# Patient Record
Sex: FEMALE | Race: WHITE | NOT HISPANIC OR LATINO | URBAN - METROPOLITAN AREA
[De-identification: names, ages, dates, MRNs, and addresses within clinical notes are randomized per-mention and may not be internally consistent; named-entity substitution may affect disease eponyms.]

---

## 2018-01-08 ENCOUNTER — NEW PATIENT (OUTPATIENT)
Dept: URBAN - METROPOLITAN AREA CLINIC 51 | Facility: CLINIC | Age: 68
End: 2018-01-08
Payer: MEDICARE

## 2018-01-08 DIAGNOSIS — H53.022 REFRACTIVE AMBLYOPIA, LEFT EYE: ICD-10-CM

## 2018-01-08 DIAGNOSIS — H25.13 AGE-RELATED NUCLEAR CATARACT, BILATERAL: Primary | ICD-10-CM

## 2018-01-08 DIAGNOSIS — H04.123 TEAR FILM INSUFFICIENCY OF BILATERAL LACRIMAL GLANDS: ICD-10-CM

## 2018-01-08 PROCEDURE — 92004 COMPRE OPH EXAM NEW PT 1/>: CPT | Performed by: OPTOMETRIST

## 2018-01-08 PROCEDURE — 92015 DETERMINE REFRACTIVE STATE: CPT | Performed by: OPTOMETRIST

## 2018-01-08 ASSESSMENT — INTRAOCULAR PRESSURE
OS: 19
OD: 18

## 2018-01-08 ASSESSMENT — VISUAL ACUITY
OS: 20/40
OD: 20/30

## 2019-02-12 ENCOUNTER — FOLLOW UP ESTABLISHED (OUTPATIENT)
Dept: URBAN - METROPOLITAN AREA CLINIC 51 | Facility: CLINIC | Age: 69
End: 2019-02-12
Payer: COMMERCIAL

## 2019-02-12 PROCEDURE — S0621 ROUTINE OPHTHALMOLOGICAL EXA: HCPCS | Performed by: OPTOMETRIST

## 2019-02-12 ASSESSMENT — VISUAL ACUITY
OS: 20/40
OD: 20/25

## 2019-02-12 ASSESSMENT — KERATOMETRY
OS: 42.72
OD: 41.34

## 2019-02-12 ASSESSMENT — INTRAOCULAR PRESSURE
OD: 17
OS: 19

## 2021-12-15 ENCOUNTER — OFFICE VISIT (OUTPATIENT)
Dept: URBAN - METROPOLITAN AREA CLINIC 51 | Facility: CLINIC | Age: 71
End: 2021-12-15
Payer: MEDICARE

## 2021-12-15 DIAGNOSIS — H25.813 COMBINED FORMS OF AGE-RELATED CATARACT, BILATERAL: ICD-10-CM

## 2021-12-15 DIAGNOSIS — H43.313 VITREOUS MEMBRANES AND STRANDS, BILATERAL: ICD-10-CM

## 2021-12-15 DIAGNOSIS — H52.4 PRESBYOPIA: Primary | ICD-10-CM

## 2021-12-15 DIAGNOSIS — D31.31 BENIGN NEOPLASM OF RIGHT CHOROID: ICD-10-CM

## 2021-12-15 PROCEDURE — 99214 OFFICE O/P EST MOD 30 MIN: CPT | Performed by: OPTOMETRIST

## 2021-12-15 PROCEDURE — 92134 CPTRZ OPH DX IMG PST SGM RTA: CPT | Performed by: OPTOMETRIST

## 2021-12-15 ASSESSMENT — KERATOMETRY
OD: 41.11
OS: 42.51

## 2021-12-15 ASSESSMENT — INTRAOCULAR PRESSURE
OS: 13
OD: 14

## 2021-12-15 ASSESSMENT — VISUAL ACUITY
OS: 20/50
OD: 20/25

## 2021-12-15 NOTE — IMPRESSION/PLAN
Impression: Combined forms of age-related cataract, bilateral Plan: recommend cat sx when symptomatic

## 2022-12-19 ENCOUNTER — OFFICE VISIT (OUTPATIENT)
Dept: URBAN - METROPOLITAN AREA CLINIC 51 | Facility: CLINIC | Age: 72
End: 2022-12-19
Payer: MEDICARE

## 2022-12-19 DIAGNOSIS — H04.123 DRY EYE SYNDROME OF BILATERAL LACRIMAL GLANDS: ICD-10-CM

## 2022-12-19 DIAGNOSIS — H43.313 VITREOUS MEMBRANES AND STRANDS, BILATERAL: ICD-10-CM

## 2022-12-19 DIAGNOSIS — H53.022 REFRACTIVE AMBLYOPIA, LEFT EYE: ICD-10-CM

## 2022-12-19 DIAGNOSIS — H25.813 COMBINED FORMS OF AGE-RELATED CATARACT, BILATERAL: ICD-10-CM

## 2022-12-19 DIAGNOSIS — D31.31 BENIGN NEOPLASM OF RIGHT CHOROID: Primary | ICD-10-CM

## 2022-12-19 PROCEDURE — 99214 OFFICE O/P EST MOD 30 MIN: CPT | Performed by: OPTOMETRIST

## 2022-12-19 PROCEDURE — 92134 CPTRZ OPH DX IMG PST SGM RTA: CPT | Performed by: OPTOMETRIST

## 2022-12-19 ASSESSMENT — INTRAOCULAR PRESSURE
OD: 18
OS: 18

## 2022-12-19 ASSESSMENT — VISUAL ACUITY
OD: 20/25
OS: 20/50

## 2022-12-19 NOTE — IMPRESSION/PLAN
Impression: Combined forms of age-related cataract, bilateral: H25.813. Plan: Cataract causing symptomatic  impairment of visual function not correctable with a tolerable change in glasses or contact lenses resulting in the patient's inability to function satisfactorily while performing Activities of Daily Life including, but not limited to reading, viewing television, driving, or meeting vocational or recreational needs. Cataracts account for the patient's complaints. Discussed all risks, benefits, procedures and recovery. pt is changing insurances and is unsure if she will come back here for surgery.

## 2024-10-23 ENCOUNTER — OFFICE VISIT (OUTPATIENT)
Dept: URBAN - METROPOLITAN AREA CLINIC 51 | Facility: CLINIC | Age: 74
End: 2024-10-23
Payer: MEDICARE

## 2024-10-23 DIAGNOSIS — H04.123 DRY EYE SYNDROME OF BILATERAL LACRIMAL GLANDS: ICD-10-CM

## 2024-10-23 DIAGNOSIS — H18.593 OTHER HEREDITARY CORNEAL DYSTROPHIES: ICD-10-CM

## 2024-10-23 DIAGNOSIS — H25.813 COMBINED FORMS OF AGE-RELATED CATARACT, BILATERAL: Primary | ICD-10-CM

## 2024-10-23 DIAGNOSIS — H43.313 VITREOUS MEMBRANES AND STRANDS, BILATERAL: ICD-10-CM

## 2024-10-23 DIAGNOSIS — H53.022 REFRACTIVE AMBLYOPIA, LEFT EYE: ICD-10-CM

## 2024-10-23 DIAGNOSIS — D31.31 BENIGN NEOPLASM OF RIGHT CHOROID: ICD-10-CM

## 2024-10-23 PROCEDURE — 92134 CPTRZ OPH DX IMG PST SGM RTA: CPT | Performed by: OPTOMETRIST

## 2024-10-23 PROCEDURE — 99214 OFFICE O/P EST MOD 30 MIN: CPT | Performed by: OPTOMETRIST

## 2024-10-23 ASSESSMENT — VISUAL ACUITY
OD: 20/25
OS: 20/40

## 2024-10-23 ASSESSMENT — INTRAOCULAR PRESSURE
OS: 19
OD: 19

## 2024-10-23 ASSESSMENT — KERATOMETRY
OS: 42.25
OD: 41.25

## 2024-11-04 ENCOUNTER — TECH ONLY (OUTPATIENT)
Dept: URBAN - METROPOLITAN AREA CLINIC 44 | Facility: CLINIC | Age: 74
End: 2024-11-04
Payer: MEDICARE

## 2024-11-04 DIAGNOSIS — H25.813 COMBINED FORMS OF AGE-RELATED CATARACT, BILATERAL: Primary | ICD-10-CM

## 2024-11-04 DIAGNOSIS — H25.13 AGE-RELATED NUCLEAR CATARACT, BILATERAL: ICD-10-CM

## 2024-11-04 DIAGNOSIS — Z01.818 ENCOUNTER FOR OTHER PREPROCEDURAL EXAMINATION: Primary | ICD-10-CM

## 2024-11-04 PROCEDURE — 99203 OFFICE O/P NEW LOW 30 MIN: CPT | Performed by: PHYSICIAN ASSISTANT

## 2024-11-04 RX ORDER — VENLAFAXINE HYDROCHLORIDE 75 MG/1
75 MG TABLET ORAL
Qty: 0 | Refills: 0 | Status: ACTIVE
Start: 2024-11-04

## 2024-11-04 RX ORDER — AMLODIPINE BESYLATE 2.5 MG/1
2.5 MG TABLET ORAL
Qty: 0 | Refills: 0 | Status: ACTIVE
Start: 2024-11-04

## 2024-11-04 ASSESSMENT — PACHYMETRY
OD: 2.80
OS: 23.40
OS: 2.77
OD: 23.54

## 2024-11-20 ENCOUNTER — OFFICE VISIT (OUTPATIENT)
Dept: URBAN - METROPOLITAN AREA CLINIC 44 | Facility: CLINIC | Age: 74
End: 2024-11-20
Payer: MEDICARE

## 2024-11-20 DIAGNOSIS — H25.811 COMBINED FORMS OF AGE-RELATED CATARACT, RIGHT EYE: ICD-10-CM

## 2024-11-20 DIAGNOSIS — H53.022 REFRACTIVE AMBLYOPIA, LEFT EYE: ICD-10-CM

## 2024-11-20 DIAGNOSIS — H18.593 OTHER HEREDITARY CORNEAL DYSTROPHIES: ICD-10-CM

## 2024-11-20 DIAGNOSIS — D31.31 BENIGN NEOPLASM OF RIGHT CHOROID: ICD-10-CM

## 2024-11-20 DIAGNOSIS — H04.123 DRY EYE SYNDROME OF BILATERAL LACRIMAL GLANDS: ICD-10-CM

## 2024-11-20 DIAGNOSIS — H25.813 COMBINED FORMS OF AGE-RELATED CATARACT, BILATERAL: Primary | ICD-10-CM

## 2024-11-20 DIAGNOSIS — H43.313 VITREOUS MEMBRANES AND STRANDS, BILATERAL: ICD-10-CM

## 2024-11-20 PROCEDURE — 99204 OFFICE O/P NEW MOD 45 MIN: CPT | Performed by: OPHTHALMOLOGY

## 2024-11-20 PROCEDURE — 76514 ECHO EXAM OF EYE THICKNESS: CPT | Performed by: OPHTHALMOLOGY

## 2024-11-20 PROCEDURE — 92025 CPTRIZED CORNEAL TOPOGRAPHY: CPT | Performed by: OPHTHALMOLOGY

## 2024-11-20 PROCEDURE — 92286 ANT SGM IMG I&R SPECLR MIC: CPT | Performed by: OPHTHALMOLOGY

## 2024-11-20 ASSESSMENT — INTRAOCULAR PRESSURE
OS: 12
OD: 12

## 2024-12-17 ENCOUNTER — SURGERY (OUTPATIENT)
Dept: URBAN - METROPOLITAN AREA SURGERY 19 | Facility: SURGERY | Age: 74
End: 2024-12-17
Payer: MEDICARE

## 2024-12-17 PROCEDURE — 66984 XCAPSL CTRC RMVL W/O ECP: CPT | Performed by: OPHTHALMOLOGY

## 2024-12-18 ENCOUNTER — POST-OPERATIVE VISIT (OUTPATIENT)
Dept: URBAN - METROPOLITAN AREA CLINIC 44 | Facility: CLINIC | Age: 74
End: 2024-12-18
Payer: MEDICARE

## 2024-12-18 DIAGNOSIS — Z48.810 ENCOUNTER FOR SURGICAL AFTERCARE FOLLOWING SURGERY ON A SENSE ORGAN: Primary | ICD-10-CM

## 2024-12-18 PROCEDURE — 99024 POSTOP FOLLOW-UP VISIT: CPT | Performed by: OPTOMETRIST

## 2024-12-18 ASSESSMENT — INTRAOCULAR PRESSURE: OS: 14

## 2024-12-26 DIAGNOSIS — Z48.810 ENCOUNTER FOR SURGICAL AFTERCARE FOLLOWING SURGERY ON A SENSE ORGAN: Primary | ICD-10-CM

## 2024-12-26 DIAGNOSIS — H52.4 PRESBYOPIA: ICD-10-CM

## 2024-12-26 PROCEDURE — 92015 DETERMINE REFRACTIVE STATE: CPT | Performed by: OPTOMETRIST

## 2024-12-26 PROCEDURE — 99024 POSTOP FOLLOW-UP VISIT: CPT | Performed by: OPTOMETRIST

## 2024-12-26 ASSESSMENT — VISUAL ACUITY
OD: 20/40
OS: 20/40

## 2024-12-26 ASSESSMENT — INTRAOCULAR PRESSURE
OS: 13
OD: 13

## 2024-12-31 ENCOUNTER — SURGERY (OUTPATIENT)
Dept: URBAN - METROPOLITAN AREA SURGERY 19 | Facility: SURGERY | Age: 74
End: 2024-12-31
Payer: MEDICARE

## 2024-12-31 PROCEDURE — 66984 XCAPSL CTRC RMVL W/O ECP: CPT | Performed by: OPHTHALMOLOGY

## 2025-01-01 ENCOUNTER — POST-OPERATIVE VISIT (OUTPATIENT)
Dept: URBAN - METROPOLITAN AREA CLINIC 44 | Facility: CLINIC | Age: 75
End: 2025-01-01
Payer: MEDICARE

## 2025-01-01 DIAGNOSIS — Z96.1 PRESENCE OF INTRAOCULAR LENS: Primary | ICD-10-CM

## 2025-01-01 PROCEDURE — 99024 POSTOP FOLLOW-UP VISIT: CPT | Performed by: OPTOMETRIST

## 2025-01-01 ASSESSMENT — INTRAOCULAR PRESSURE: OD: 22

## 2025-01-29 ENCOUNTER — POST-OPERATIVE VISIT (OUTPATIENT)
Dept: URBAN - METROPOLITAN AREA CLINIC 51 | Facility: CLINIC | Age: 75
End: 2025-01-29
Payer: MEDICARE

## 2025-01-29 DIAGNOSIS — H52.4 PRESBYOPIA: Primary | ICD-10-CM

## 2025-01-29 DIAGNOSIS — Z96.1 PRESENCE OF INTRAOCULAR LENS: ICD-10-CM

## 2025-01-29 PROCEDURE — 99024 POSTOP FOLLOW-UP VISIT: CPT | Performed by: OPTOMETRIST

## 2025-01-29 ASSESSMENT — INTRAOCULAR PRESSURE
OD: 15
OS: 15

## 2025-01-29 ASSESSMENT — VISUAL ACUITY
OD: 20/20
OS: 20/40

## 2025-01-29 ASSESSMENT — KERATOMETRY
OD: 42.00
OS: 42.88

## 2025-08-05 ENCOUNTER — OFFICE VISIT (OUTPATIENT)
Dept: URBAN - METROPOLITAN AREA CLINIC 51 | Facility: CLINIC | Age: 75
End: 2025-08-05
Payer: MEDICARE

## 2025-08-05 DIAGNOSIS — H43.313 VITREOUS MEMBRANES AND STRANDS, BILATERAL: ICD-10-CM

## 2025-08-05 DIAGNOSIS — H04.123 DRY EYE SYNDROME OF BILATERAL LACRIMAL GLANDS: ICD-10-CM

## 2025-08-05 DIAGNOSIS — H01.114 ALLERGIC DERMATITIS OF LEFT UPPER EYELID: Primary | ICD-10-CM

## 2025-08-05 DIAGNOSIS — H18.593 OTHER HEREDITARY CORNEAL DYSTROPHIES: ICD-10-CM

## 2025-08-05 DIAGNOSIS — D31.31 BENIGN NEOPLASM OF RIGHT CHOROID: ICD-10-CM

## 2025-08-05 DIAGNOSIS — H53.022 REFRACTIVE AMBLYOPIA, LEFT EYE: ICD-10-CM

## 2025-08-05 PROCEDURE — 92134 CPTRZ OPH DX IMG PST SGM RTA: CPT | Performed by: OPTOMETRIST

## 2025-08-05 PROCEDURE — 99214 OFFICE O/P EST MOD 30 MIN: CPT | Performed by: OPTOMETRIST

## 2025-08-05 RX ORDER — NEOMYCIN SULFATE, POLYMYXIN B SULFATE AND DEXAMETHASONE 1; 3.5; 1 MG/G; MG/G; [USP'U]/G
OINTMENT OPHTHALMIC
Qty: 3.5 | Refills: 1 | Status: ACTIVE
Start: 2025-08-05

## 2025-08-05 ASSESSMENT — VISUAL ACUITY
OS: 20/40
OD: 20/25

## 2025-08-05 ASSESSMENT — INTRAOCULAR PRESSURE
OD: 14
OS: 14

## 2025-08-05 ASSESSMENT — KERATOMETRY
OD: 42.25
OS: 42.88

## 2025-08-20 ENCOUNTER — OFFICE VISIT (OUTPATIENT)
Dept: URBAN - METROPOLITAN AREA CLINIC 51 | Facility: CLINIC | Age: 75
End: 2025-08-20
Payer: MEDICARE

## 2025-08-20 DIAGNOSIS — H01.114 ALLERGIC DERMATITIS OF LEFT UPPER EYELID: Primary | ICD-10-CM

## 2025-08-20 PROCEDURE — 99213 OFFICE O/P EST LOW 20 MIN: CPT | Performed by: OPTOMETRIST
